# Patient Record
Sex: FEMALE | Race: OTHER | NOT HISPANIC OR LATINO
[De-identification: names, ages, dates, MRNs, and addresses within clinical notes are randomized per-mention and may not be internally consistent; named-entity substitution may affect disease eponyms.]

---

## 2017-05-24 ENCOUNTER — TRANSCRIPTION ENCOUNTER (OUTPATIENT)
Age: 62
End: 2017-05-24

## 2018-11-07 ENCOUNTER — RECORD ABSTRACTING (OUTPATIENT)
Age: 63
End: 2018-11-07

## 2018-11-07 DIAGNOSIS — Z87.09 PERSONAL HISTORY OF OTHER DISEASES OF THE RESPIRATORY SYSTEM: ICD-10-CM

## 2018-11-07 DIAGNOSIS — Z80.0 FAMILY HISTORY OF MALIGNANT NEOPLASM OF DIGESTIVE ORGANS: ICD-10-CM

## 2018-11-07 DIAGNOSIS — H83.03 LABYRINTHITIS, BILATERAL: ICD-10-CM

## 2018-11-07 DIAGNOSIS — Z83.6 FAMILY HISTORY OF OTHER DISEASES OF THE RESPIRATORY SYSTEM: ICD-10-CM

## 2018-11-07 DIAGNOSIS — Z87.440 PERSONAL HISTORY OF URINARY (TRACT) INFECTIONS: ICD-10-CM

## 2018-11-07 DIAGNOSIS — Z80.1 FAMILY HISTORY OF MALIGNANT NEOPLASM OF TRACHEA, BRONCHUS AND LUNG: ICD-10-CM

## 2018-11-07 LAB — CYTOLOGY CVX/VAG DOC THIN PREP: NORMAL

## 2018-12-11 ENCOUNTER — APPOINTMENT (OUTPATIENT)
Dept: OBGYN | Facility: CLINIC | Age: 63
End: 2018-12-11
Payer: COMMERCIAL

## 2018-12-11 VITALS
DIASTOLIC BLOOD PRESSURE: 76 MMHG | HEIGHT: 61 IN | SYSTOLIC BLOOD PRESSURE: 114 MMHG | BODY MASS INDEX: 21.14 KG/M2 | WEIGHT: 112 LBS

## 2018-12-11 PROCEDURE — 99214 OFFICE O/P EST MOD 30 MIN: CPT | Mod: 25

## 2018-12-11 PROCEDURE — 56605 BIOPSY OF VULVA/PERINEUM: CPT

## 2018-12-14 LAB — CORE LAB BIOPSY: NORMAL

## 2019-04-17 ENCOUNTER — RX RENEWAL (OUTPATIENT)
Age: 64
End: 2019-04-17

## 2019-05-21 ENCOUNTER — LABORATORY RESULT (OUTPATIENT)
Age: 64
End: 2019-05-21

## 2019-05-21 ENCOUNTER — APPOINTMENT (OUTPATIENT)
Dept: INTERNAL MEDICINE | Facility: CLINIC | Age: 64
End: 2019-05-21
Payer: COMMERCIAL

## 2019-05-21 VITALS
DIASTOLIC BLOOD PRESSURE: 70 MMHG | HEIGHT: 61 IN | BODY MASS INDEX: 21.34 KG/M2 | WEIGHT: 113 LBS | SYSTOLIC BLOOD PRESSURE: 112 MMHG

## 2019-05-21 DIAGNOSIS — R87.810 CERVICAL HIGH RISK HUMAN PAPILLOMAVIRUS (HPV) DNA TEST POSITIVE: ICD-10-CM

## 2019-05-21 DIAGNOSIS — Z86.39 PERSONAL HISTORY OF OTHER ENDOCRINE, NUTRITIONAL AND METABOLIC DISEASE: ICD-10-CM

## 2019-05-21 DIAGNOSIS — Z78.9 OTHER SPECIFIED HEALTH STATUS: ICD-10-CM

## 2019-05-21 DIAGNOSIS — R63.5 ABNORMAL WEIGHT GAIN: ICD-10-CM

## 2019-05-21 PROCEDURE — 99396 PREV VISIT EST AGE 40-64: CPT | Mod: 25

## 2019-05-21 PROCEDURE — 93000 ELECTROCARDIOGRAM COMPLETE: CPT

## 2019-05-21 PROCEDURE — 36415 COLL VENOUS BLD VENIPUNCTURE: CPT

## 2019-05-21 RX ORDER — CLOBETASOL PROPIONATE 0.5 MG/G
0.05 OINTMENT TOPICAL
Refills: 0 | Status: DISCONTINUED | COMMUNITY
End: 2019-05-21

## 2019-05-21 RX ORDER — OMEGA-3/DHA/EPA/FISH OIL 300-1000MG
1000 CAPSULE ORAL
Refills: 0 | Status: DISCONTINUED | COMMUNITY
End: 2019-05-21

## 2019-05-21 NOTE — HISTORY OF PRESENT ILLNESS
[FreeTextEntry1] : Annual exam [de-identified] : Patient is a 63-year-old female presenting for an annual exam. Her sense of well-being is good. She is eating well sleeping well. No night sweats. No chills. No fever. 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 She is very concerned. She is also upset about the death of her mother. Other domestic stresses. She's been on Zoloft in the past. It was strongly recommended that this be restarted. She agreed. She is concerned about weight gain. She works out regularly with yoga and at the gymnasium. She's had no significant trauma hospitalization, surgery, other than the HPV or serious illnesses. Systems review is largely negative.

## 2019-05-21 NOTE — HEALTH RISK ASSESSMENT
[Excellent] : ~his/her~  mood as  excellent [] : No [No falls in past year] : Patient reported no falls in the past year [0] : 2) Feeling down, depressed, or hopeless: Not at all (0)

## 2019-05-22 LAB
ALBUMIN SERPL ELPH-MCNC: 4.9 G/DL
ALP BLD-CCNC: 65 U/L
ALT SERPL-CCNC: 12 U/L
ANION GAP SERPL CALC-SCNC: 13 MMOL/L
APPEARANCE: ABNORMAL
AST SERPL-CCNC: 21 U/L
BASOPHILS # BLD AUTO: 0.04 K/UL
BASOPHILS NFR BLD AUTO: 0.8 %
BILIRUB SERPL-MCNC: 0.6 MG/DL
BILIRUBIN URINE: NEGATIVE
BLOOD URINE: NORMAL
BUN SERPL-MCNC: 12 MG/DL
CALCIUM SERPL-MCNC: 9.8 MG/DL
CHLORIDE SERPL-SCNC: 104 MMOL/L
CHOLEST SERPL-MCNC: 271 MG/DL
CHOLEST/HDLC SERPL: 2.3 RATIO
CO2 SERPL-SCNC: 23 MMOL/L
COLOR: YELLOW
CREAT SERPL-MCNC: 0.62 MG/DL
EOSINOPHIL # BLD AUTO: 0.04 K/UL
EOSINOPHIL NFR BLD AUTO: 0.8 %
GLUCOSE QUALITATIVE U: NEGATIVE
GLUCOSE SERPL-MCNC: 83 MG/DL
HCT VFR BLD CALC: 42.6 %
HDLC SERPL-MCNC: 117 MG/DL
HGB BLD-MCNC: 13.9 G/DL
IMM GRANULOCYTES NFR BLD AUTO: 0 %
KETONES URINE: ABNORMAL
LDLC SERPL CALC-MCNC: 142 MG/DL
LEUKOCYTE ESTERASE URINE: NEGATIVE
LYMPHOCYTES # BLD AUTO: 2.41 K/UL
LYMPHOCYTES NFR BLD AUTO: 46.8 %
MAN DIFF?: NORMAL
MCHC RBC-ENTMCNC: 31.8 PG
MCHC RBC-ENTMCNC: 32.6 GM/DL
MCV RBC AUTO: 97.5 FL
MONOCYTES # BLD AUTO: 0.31 K/UL
MONOCYTES NFR BLD AUTO: 6 %
NEUTROPHILS # BLD AUTO: 2.35 K/UL
NEUTROPHILS NFR BLD AUTO: 45.6 %
NITRITE URINE: POSITIVE
PH URINE: 7
PLATELET # BLD AUTO: 260 K/UL
POTASSIUM SERPL-SCNC: 5.2 MMOL/L
PROT SERPL-MCNC: 7.2 G/DL
PROTEIN URINE: NEGATIVE
RBC # BLD: 4.37 M/UL
RBC # FLD: 13.7 %
SODIUM SERPL-SCNC: 140 MMOL/L
SPECIFIC GRAVITY URINE: 1.02
T4 FREE SERPL-MCNC: 1.2 NG/DL
TRIGL SERPL-MCNC: 62 MG/DL
TSH SERPL-ACNC: 1.65 UIU/ML
UROBILINOGEN URINE: NORMAL
WBC # FLD AUTO: 5.15 K/UL

## 2019-09-25 ENCOUNTER — APPOINTMENT (OUTPATIENT)
Dept: OBGYN | Facility: CLINIC | Age: 64
End: 2019-09-25
Payer: COMMERCIAL

## 2019-09-25 ENCOUNTER — APPOINTMENT (OUTPATIENT)
Dept: INTERNAL MEDICINE | Facility: CLINIC | Age: 64
End: 2019-09-25
Payer: COMMERCIAL

## 2019-09-25 VITALS
SYSTOLIC BLOOD PRESSURE: 100 MMHG | WEIGHT: 112 LBS | DIASTOLIC BLOOD PRESSURE: 72 MMHG | HEIGHT: 61 IN | BODY MASS INDEX: 21.14 KG/M2

## 2019-09-25 VITALS
SYSTOLIC BLOOD PRESSURE: 100 MMHG | DIASTOLIC BLOOD PRESSURE: 70 MMHG | HEIGHT: 61 IN | WEIGHT: 112 LBS | BODY MASS INDEX: 21.14 KG/M2

## 2019-09-25 DIAGNOSIS — N76.0 ACUTE VAGINITIS: ICD-10-CM

## 2019-09-25 DIAGNOSIS — R92.2 INCONCLUSIVE MAMMOGRAM: ICD-10-CM

## 2019-09-25 DIAGNOSIS — Z12.31 ENCOUNTER FOR SCREENING MAMMOGRAM FOR MALIGNANT NEOPLASM OF BREAST: ICD-10-CM

## 2019-09-25 DIAGNOSIS — Z11.51 ENCOUNTER FOR SCREENING FOR HUMAN PAPILLOMAVIRUS (HPV): ICD-10-CM

## 2019-09-25 PROCEDURE — 99213 OFFICE O/P EST LOW 20 MIN: CPT

## 2019-09-25 PROCEDURE — 99396 PREV VISIT EST AGE 40-64: CPT

## 2019-09-25 NOTE — HISTORY OF PRESENT ILLNESS
[FreeTextEntry8] : Patient is a 63-year-old female presenting basically for an annual exam. However, she had one just 5 months ago. It was suggested that it is in clinic change much. She was seen by her gynecologist and pointed out that her bone density done this year revealed her to be osteoporotic in her lumbar spine. Her left hip. Interest is the fact that this hasn't changed since 2015. She is adverse to taking medications. She is actively involved in aerobics, and yoga . The effect of exercise on osteoporosis was discussed. As for diphosphonate. Other modalities are available from her endocrinologist with whom she has an appointment tomorrow. Her blood work from 5 months ago was reviewed. As was the bone density report

## 2019-09-26 ENCOUNTER — APPOINTMENT (OUTPATIENT)
Dept: ENDOCRINOLOGY | Facility: CLINIC | Age: 64
End: 2019-09-26
Payer: COMMERCIAL

## 2019-09-26 VITALS
DIASTOLIC BLOOD PRESSURE: 70 MMHG | HEART RATE: 72 BPM | BODY MASS INDEX: 23.09 KG/M2 | SYSTOLIC BLOOD PRESSURE: 100 MMHG | WEIGHT: 113 LBS | OXYGEN SATURATION: 98 % | HEIGHT: 58.75 IN

## 2019-09-26 PROBLEM — Z12.31 OTHER SCREENING MAMMOGRAM: Status: ACTIVE | Noted: 2019-09-25

## 2019-09-26 PROBLEM — Z11.51 SCREENING FOR HPV (HUMAN PAPILLOMAVIRUS): Status: ACTIVE | Noted: 2019-09-26

## 2019-09-26 PROBLEM — R92.2 DENSE BREAST: Status: ACTIVE | Noted: 2019-09-25

## 2019-09-26 PROCEDURE — 99245 OFF/OP CONSLTJ NEW/EST HI 55: CPT

## 2019-09-26 NOTE — HISTORY OF PRESENT ILLNESS
[FreeTextEntry1] : Ms. BRAYAN TOLBERT is 63 year old who presents for osteoporosis evaluation. \par she  was diagnosed of osteoporosis on the basis of     DXA scan \par her  Last [facility] scan was done in 2018 [facility], -2.5 at level os spine \par she  has had fractures -none \par  family h/o fracture -none \par She under went menopause at the age of 44 \par She received HRT    -no \par she received any treatment for osteoporosis - none \par  h/o malignancies -no \par  h/o radiation treatment -no \par  h/o clots-no \par h/o long-term steroids ( > 3 mths ) -no \par h/o phenytoin use , seizures  -no \par h/o eating disorders -no \par h/o prolonged amenorrhea -no \par Calcium intake \par Dietary - yes cheese \par supplemental -none \par Vit D intake - started yesterday \par h/o renal stones -yes \par h/o reflux disease -yes ,on PPI \par h/o malabsorption - lactose intolerant \par h/o falls or balance issues -yes \par \par

## 2019-09-27 ENCOUNTER — RESULT REVIEW (OUTPATIENT)
Age: 64
End: 2019-09-27

## 2019-09-30 ENCOUNTER — RX RENEWAL (OUTPATIENT)
Age: 64
End: 2019-09-30

## 2019-09-30 LAB
25(OH)D3 SERPL-MCNC: 20.8 NG/ML
ALBUMIN SERPL ELPH-MCNC: 4.9 G/DL
ALP BLD-CCNC: 59 U/L
ANION GAP SERPL CALC-SCNC: 13 MMOL/L
BUN SERPL-MCNC: 13 MG/DL
CALCIUM SERPL-MCNC: 9.7 MG/DL
CALCIUM SERPL-MCNC: 9.7 MG/DL
CHLORIDE SERPL-SCNC: 106 MMOL/L
CO2 SERPL-SCNC: 25 MMOL/L
COLLAGEN CTX SERPL-MCNC: 368 PG/ML
CREAT SERPL-MCNC: 0.69 MG/DL
GLUCOSE SERPL-MCNC: 96 MG/DL
M PROTEIN SPEC IFE-MCNC: NORMAL
PARATHYROID HORMONE INTACT: 53 PG/ML
PHOSPHATE SERPL-MCNC: 3 MG/DL
POTASSIUM SERPL-SCNC: 4.8 MMOL/L
SODIUM SERPL-SCNC: 144 MMOL/L

## 2019-10-03 LAB
CANDIDA VAG CYTO: NOT DETECTED
CYTOLOGY CVX/VAG DOC THIN PREP: ABNORMAL
G VAGINALIS+PREV SP MTYP VAG QL MICRO: DETECTED
HPV HIGH+LOW RISK DNA PNL CVX: NOT DETECTED
T VAGINALIS VAG QL WET PREP: NOT DETECTED

## 2019-11-18 ENCOUNTER — TRANSCRIPTION ENCOUNTER (OUTPATIENT)
Age: 64
End: 2019-11-18

## 2019-11-18 ENCOUNTER — APPOINTMENT (OUTPATIENT)
Dept: ENDOCRINOLOGY | Facility: CLINIC | Age: 64
End: 2019-11-18
Payer: MEDICARE

## 2019-11-18 VITALS
SYSTOLIC BLOOD PRESSURE: 112 MMHG | BODY MASS INDEX: 23.81 KG/M2 | HEIGHT: 58.25 IN | OXYGEN SATURATION: 99 % | DIASTOLIC BLOOD PRESSURE: 82 MMHG | HEART RATE: 74 BPM | WEIGHT: 115 LBS

## 2019-11-18 DIAGNOSIS — Z91.013 ALLERGY TO SEAFOOD: ICD-10-CM

## 2019-11-18 DIAGNOSIS — Z78.9 OTHER SPECIFIED HEALTH STATUS: ICD-10-CM

## 2019-11-18 PROCEDURE — 99215 OFFICE O/P EST HI 40 MIN: CPT

## 2019-11-18 RX ORDER — DIPHENHYDRAMINE HCL 2 %
25 CREAM (GRAM) TOPICAL
Refills: 0 | Status: ACTIVE | COMMUNITY
Start: 2019-11-18

## 2019-11-18 RX ORDER — METRONIDAZOLE 7.5 MG/G
0.75 GEL VAGINAL
Qty: 1 | Refills: 0 | Status: DISCONTINUED | COMMUNITY
Start: 2019-09-30 | End: 2019-11-18

## 2019-11-18 RX ORDER — ADHESIVE TAPE 3"X 2.3 YD
50 MCG TAPE, NON-MEDICATED TOPICAL
Refills: 0 | Status: ACTIVE | COMMUNITY

## 2019-11-18 RX ORDER — FOLIC ACID 0.8 MG
500 TABLET ORAL
Refills: 0 | Status: ACTIVE | COMMUNITY

## 2019-11-18 NOTE — REVIEW OF SYSTEMS
[Fatigue] : fatigue [Palpitations] : palpitations [Muscle Weakness] : muscle weakness [Muscle Cramps] : muscle cramps [Back Pain] : back pain [Depression] : depression [Negative] : Psychiatric [Myalgia] : no myalgia  [FreeTextEntry5] : increased MN per pt seen Cardiology few years ago  [FreeTextEntry4] : ringing in ears, loss of hearing sometimes, hoarseness seen ENT [de-identified] : memory problems , difficulty with concentrations [de-identified] : especially numbness/tingling in hands  [FreeTextEntry9] : loss of height 1inches and a quarter

## 2019-11-18 NOTE — HISTORY OF PRESENT ILLNESS
[Vitamin D (supplements)] : Vitamin D as a dietary supplement [Premat. Menopause (natural)] : premature menopause which occurred naturally [Amenorrhea] : a history of amenorrhea [Kidney Stones] : a history of kidney stones [Family History of Osteoporosis] : family history of osteoporosis [Regular Dental Follow-Up] : regular dental follow-up [Loss of Height] : loss of height [FreeTextEntry1] : Ms. BRAYAN TOLBERT is a 64 year old female\par self referred for osteoporosis seen Dr Vieira 9/19 but wanted to see me \par had one episode of kidney stones 2012 \par denies h/o fractures\par had some amenorrhea episodes per pt for years\par menopause 44\par on Omeproazole on and off for GERD\par \par Per Dr Vieira notes : \par "Ms. BRAYAN TOLBERT is 63 year old who presents for osteoporosis evaluation. \par she was diagnosed of osteoporosis on the basis of DXA scan \par her Last DXA scan was done in 2018 at [facility], -2.5 at level os spine \par she has had fractures -none \par  family h/o fracture -none \par She under went menopause at the age of 44 \par She received HRT -no \par she received any treatment for osteoporosis - none \par  h/o malignancies -no \par  h/o radiation treatment -no \par  h/o clots-no \par h/o long-term steroids ( > 3 mths ) -no \par h/o phenytoin use , seizures -no \par h/o eating disorders -no \par h/o prolonged amenorrhea -no \par Calcium intake \par Dietary - yes cheese \par supplemental -none \par Vit D intake - started yesterday \par h/o renal stones -yes \par h/o reflux disease -yes ,on PPI \par h/o malabsorption - lactose intolerant \par h/o falls or balance issues -yes "\par  [Disordered Eating] : no past or present history of disordered eating [Premat. Menopause (surg)] : no past or present history of premature surgical menopause [Sedentary] : no past or present history of a sedentary lifestyle [Taking Steroids] : no past or present history of taking steroids [Current Smoking___(ppd)] : not currently smoking [High Fall Risk] : no fall risk [Family History of Hip Fracture] : no family history of hip fracture [Frequent Falls] : no frequent falls [Uses a Walker/Cane] : no use of a walker/cane [History of Radiation Therapy] : no history of radiation therapy [Hyperparathyroidism] : no hyperparathyroidism [Previous Fragility Fracture] : no previous fragility fracture [History of Blood Clots] : no history of blood clots

## 2019-11-18 NOTE — PHYSICAL EXAM
[Alert] : alert [Well Nourished] : well nourished [No Acute Distress] : no acute distress [Well Developed] : well developed [EOMI] : extra ocular movement intact [Normal Sclera/Conjunctiva] : normal sclera/conjunctiva [No Proptosis] : no proptosis [Normal Oropharynx] : the oropharynx was normal [Thyroid Not Enlarged] : the thyroid was not enlarged [No Thyroid Nodules] : there were no palpable thyroid nodules [No Accessory Muscle Use] : no accessory muscle use [No Respiratory Distress] : no respiratory distress [Clear to Auscultation] : lungs were clear to auscultation bilaterally [Normal S1, S2] : normal S1 and S2 [Normal Rate] : heart rate was normal  [Pedal Pulses Normal] : the pedal pulses are present [Regular Rhythm] : with a regular rhythm [No Edema] : there was no peripheral edema [Normal Bowel Sounds] : normal bowel sounds [Not Tender] : non-tender [Soft] : abdomen soft [Not Distended] : not distended [Post Cervical Nodes] : posterior cervical nodes [Anterior Cervical Nodes] : anterior cervical nodes [Normal] : normal and non tender [Axillary Nodes] : axillary nodes [No Spinal Tenderness] : no spinal tenderness [Spine Straight] : spine straight [Normal Gait] : normal gait [No Stigmata of Cushings Syndrome] : no stigmata of cushings syndrome [Normal Strength/Tone] : muscle strength and tone were normal [No Rash] : no rash [No Tremors] : no tremors [Normal Reflexes] : deep tendon reflexes were 2+ and symmetric [Oriented x3] : oriented to person, place, and time [Acanthosis Nigricans] : no acanthosis nigricans

## 2019-11-18 NOTE — ASSESSMENT
[Bisphosphonate Therapy] : Risks  and benefits of bisphosphonate therapy were  discussed with the patient including gastroesophageal irritation, osteonecrosis of the jaw, and atypical femur fractures, and acute phase reaction [Bisphosphonates] : The patient was instructed to take bisphosphonates on an empty stomach with a full glass of water,and wait at least 30 minutes before eating or lying down [FreeTextEntry1] : Actonel side effects as ONJ, bone aches. GERD etc call us for any side effects

## 2019-11-26 ENCOUNTER — RESULT REVIEW (OUTPATIENT)
Age: 64
End: 2019-11-26

## 2019-12-03 ENCOUNTER — LABORATORY RESULT (OUTPATIENT)
Age: 64
End: 2019-12-03

## 2019-12-03 LAB
24R-OH-CALCIDIOL SERPL-MCNC: 102 PG/ML
25(OH)D3 SERPL-MCNC: 25.7 NG/ML
CALCIUM SERPL-MCNC: 10 MG/DL
MAGNESIUM SERPL-MCNC: 2.4 MG/DL
PARATHYROID HORMONE INTACT: 52 PG/ML
PHOSPHATE SERPL-MCNC: 3 MG/DL

## 2020-01-03 LAB
COLLAGEN NTX UR-SCNC: 54
CREAT UR-MCNC: 35 MG/DL

## 2020-01-06 ENCOUNTER — MEDICATION RENEWAL (OUTPATIENT)
Age: 65
End: 2020-01-06

## 2020-02-06 ENCOUNTER — APPOINTMENT (OUTPATIENT)
Dept: UROLOGY | Facility: CLINIC | Age: 65
End: 2020-02-06
Payer: MEDICARE

## 2020-02-06 VITALS
WEIGHT: 111 LBS | SYSTOLIC BLOOD PRESSURE: 117 MMHG | BODY MASS INDEX: 21.79 KG/M2 | HEIGHT: 60 IN | DIASTOLIC BLOOD PRESSURE: 80 MMHG | RESPIRATION RATE: 89 BRPM

## 2020-02-06 DIAGNOSIS — Z82.0 FAMILY HISTORY OF EPILEPSY AND OTHER DISEASES OF THE NERVOUS SYSTEM: ICD-10-CM

## 2020-02-06 DIAGNOSIS — Z82.62 FAMILY HISTORY OF OSTEOPOROSIS: ICD-10-CM

## 2020-02-06 DIAGNOSIS — Z80.0 FAMILY HISTORY OF MALIGNANT NEOPLASM OF DIGESTIVE ORGANS: ICD-10-CM

## 2020-02-06 DIAGNOSIS — Z82.49 FAMILY HISTORY OF ISCHEMIC HEART DISEASE AND OTHER DISEASES OF THE CIRCULATORY SYSTEM: ICD-10-CM

## 2020-02-06 PROCEDURE — 99204 OFFICE O/P NEW MOD 45 MIN: CPT

## 2020-02-06 PROCEDURE — 76775 US EXAM ABDO BACK WALL LIM: CPT

## 2020-02-06 RX ORDER — CLOBETASOL PROPIONATE 0.5 MG/G
0.05 CREAM TOPICAL
Refills: 0 | Status: ACTIVE | COMMUNITY

## 2020-02-06 NOTE — ADDENDUM
[FreeTextEntry1] : Bilateral renal ultrasound\par \par \par Evaluation of both kidneys was carried out with the findings of a 100% normal right kidney the left kidney is normal in size and is no obvious lesion no large stones seen the patient has been informed that if there's a small stone could not be seen with this technology

## 2020-02-06 NOTE — ASSESSMENT
[FreeTextEntry1] : This is an initial visit to [facility] urology for this 64-year-old patient who has had progression of her osteopenia to the point where she now has osteoporosis\par There is an additional history of admission 2 [facility] for fever and flank pain he was told that she "might have a kidney stone"\par His never been a definitive diagnosis of kidney stone but the patient did have flank pain and was treated as a possible sepsis but cultures were negative\par She has no subsequent episodes of any kind\par Gross hematuria and but has been told that she has excessive excretion of calcium\par I therefore a serum calcium parathormone level and into the renal ultrasound shows no obvious obstruction or stone\par I told the patient if she has another episode of kidney stone like pain a CT of the abdomen and pelvis without contrast would be the next appropriate step

## 2020-02-07 LAB
ALBUMIN SERPL ELPH-MCNC: 4.6 G/DL
ALP BLD-CCNC: 57 U/L
ALT SERPL-CCNC: 17 U/L
ANION GAP SERPL CALC-SCNC: 14 MMOL/L
AST SERPL-CCNC: 21 U/L
BILIRUB SERPL-MCNC: 0.4 MG/DL
BUN SERPL-MCNC: 14 MG/DL
CALCIUM SERPL-MCNC: 9.3 MG/DL
CHLORIDE SERPL-SCNC: 105 MMOL/L
CO2 SERPL-SCNC: 22 MMOL/L
CREAT SERPL-MCNC: 0.63 MG/DL
GLUCOSE SERPL-MCNC: 144 MG/DL
POTASSIUM SERPL-SCNC: 4.5 MMOL/L
PROT SERPL-MCNC: 6.4 G/DL
SODIUM SERPL-SCNC: 141 MMOL/L

## 2020-02-10 LAB
CALCIUM SERPL-MCNC: 9.5 MG/DL
PARATHYROID HORMONE INTACT: 52 PG/ML

## 2020-03-06 ENCOUNTER — APPOINTMENT (OUTPATIENT)
Dept: ENDOCRINOLOGY | Facility: CLINIC | Age: 65
End: 2020-03-06
Payer: MEDICARE

## 2020-03-06 VITALS
SYSTOLIC BLOOD PRESSURE: 108 MMHG | DIASTOLIC BLOOD PRESSURE: 68 MMHG | BODY MASS INDEX: 21.6 KG/M2 | OXYGEN SATURATION: 96 % | HEART RATE: 82 BPM | WEIGHT: 110 LBS | HEIGHT: 60 IN

## 2020-03-06 PROCEDURE — 36415 COLL VENOUS BLD VENIPUNCTURE: CPT

## 2020-03-06 PROCEDURE — 99214 OFFICE O/P EST MOD 30 MIN: CPT | Mod: 25

## 2020-03-06 RX ORDER — RISEDRONATE SODIUM 150 MG/1
150 TABLET, FILM COATED ORAL
Qty: 1 | Refills: 1 | Status: DISCONTINUED | COMMUNITY
Start: 2020-01-03 | End: 2020-03-06

## 2020-03-09 LAB
24R-OH-CALCIDIOL SERPL-MCNC: 97.9 PG/ML
25(OH)D3 SERPL-MCNC: 54.7 NG/ML
ALBUMIN SERPL ELPH-MCNC: 4.7 G/DL
ALP BLD-CCNC: 52 U/L
ANION GAP SERPL CALC-SCNC: 15 MMOL/L
BUN SERPL-MCNC: 18 MG/DL
CA-I SERPL-SCNC: 1.21 MMOL/L
CALCIUM SERPL-MCNC: 9.7 MG/DL
CHLORIDE SERPL-SCNC: 104 MMOL/L
CO2 SERPL-SCNC: 22 MMOL/L
CREAT SERPL-MCNC: 0.65 MG/DL
GLUCOSE SERPL-MCNC: 81 MG/DL
MAGNESIUM SERPL-MCNC: 2.1 MG/DL
PHOSPHATE SERPL-MCNC: 3.2 MG/DL
POTASSIUM SERPL-SCNC: 4.3 MMOL/L
SODIUM SERPL-SCNC: 141 MMOL/L

## 2020-03-10 ENCOUNTER — APPOINTMENT (OUTPATIENT)
Dept: INTERNAL MEDICINE | Facility: CLINIC | Age: 65
End: 2020-03-10
Payer: MEDICARE

## 2020-03-10 ENCOUNTER — MED ADMIN CHARGE (OUTPATIENT)
Age: 65
End: 2020-03-10

## 2020-03-10 DIAGNOSIS — Z23 ENCOUNTER FOR IMMUNIZATION: ICD-10-CM

## 2020-03-10 PROCEDURE — G0008: CPT

## 2020-03-10 PROCEDURE — 90686 IIV4 VACC NO PRSV 0.5 ML IM: CPT

## 2020-03-10 PROCEDURE — G0009: CPT

## 2020-03-10 PROCEDURE — 90732 PPSV23 VACC 2 YRS+ SUBQ/IM: CPT

## 2020-03-10 NOTE — HISTORY OF PRESENT ILLNESS
[FreeTextEntry1] : Ms. BRAYAN TOLBERT is a 64 year old female\par self referred for osteoporosis seen Dr Vieira 9/19 but wanted to see me \par had lip swelling pretty severe same day with her first Actonel dose , but also she is allergic to shellfish, ate outside with a fried few hrs before it happened \par had one episode of kidney stones 2012 \par denies h/o fractures\par had some amenorrhea episodes per pt for years\par menopause 44\par on Omeproazole on and off for GERD\par \par Per Dr Vieira notes : \par "Ms. BRAYAN TOLBERT is 63 year old who presents for osteoporosis evaluation. \par she was diagnosed of osteoporosis on the basis of DXA scan \par her Last DXA scan was done in 2018 at [facility], -2.5 at level os spine \par she has had fractures -none \par  family h/o fracture -none \par She under went menopause at the age of 44 \par She received HRT -no \par she received any treatment for osteoporosis - none \par  h/o malignancies -no \par  h/o radiation treatment -no \par  h/o clots-no \par h/o long-term steroids ( > 3 mths ) -no \par h/o phenytoin use , seizures -no \par h/o eating disorders -no \par h/o prolonged amenorrhea -no \par Calcium intake \par Dietary - yes cheese \par supplemental -none \par Vit D intake - started yesterday \par h/o renal stones -yes \par h/o reflux disease -yes ,on PPI \par h/o malabsorption - lactose intolerant \par h/o falls or balance issues -yes "\par

## 2020-06-17 ENCOUNTER — APPOINTMENT (OUTPATIENT)
Dept: INTERNAL MEDICINE | Facility: CLINIC | Age: 65
End: 2020-06-17
Payer: MEDICARE

## 2020-06-17 VITALS
DIASTOLIC BLOOD PRESSURE: 80 MMHG | WEIGHT: 111 LBS | HEIGHT: 60 IN | SYSTOLIC BLOOD PRESSURE: 120 MMHG | BODY MASS INDEX: 21.79 KG/M2

## 2020-06-17 DIAGNOSIS — K22.4 DYSKINESIA OF ESOPHAGUS: ICD-10-CM

## 2020-06-17 PROCEDURE — 99213 OFFICE O/P EST LOW 20 MIN: CPT

## 2020-06-17 NOTE — HISTORY OF PRESENT ILLNESS
[FreeTextEntry8] : Patient is a 64-year-old female presented complaining of abdominal pain. The pain is largely epigastric occasionally right upper quadrant and occasionally radiating to the back. She complains also of 13 occasion inability to swallow with a crushing sort of feeling in her chest and feeling like something was just basically stuck. He wouldn't go through. She has no, eructation she's not brought up previously swallowed food. She manages to get this through and it was problematic for her. She also has had a significant change in her bowel habits, where she used to be constipated at this point in time. She has nothing but liquids up to 10 times a day and twice at night according to her. Her gallbladder was discussed the importance of an upper endoscopy in relationship to possible stricture or spasm of her distal esophagus and a significant change in her bowel habits, strongly suggestive of stricture or obstruction. She's being referred to gastroenterology for further evaluation.

## 2020-06-17 NOTE — PHYSICAL EXAM
[Well Nourished] : well nourished [No Acute Distress] : no acute distress [Well Developed] : well developed [Well-Appearing] : well-appearing [Normal Sclera/Conjunctiva] : normal sclera/conjunctiva [PERRL] : pupils equal round and reactive to light [Normal Oropharynx] : the oropharynx was normal [Normal Outer Ear/Nose] : the outer ears and nose were normal in appearance [EOMI] : extraocular movements intact [No JVD] : no jugular venous distention [Thyroid Normal, No Nodules] : the thyroid was normal and there were no nodules present [Supple] : supple [No Lymphadenopathy] : no lymphadenopathy [No Respiratory Distress] : no respiratory distress  [No Accessory Muscle Use] : no accessory muscle use [Clear to Auscultation] : lungs were clear to auscultation bilaterally [Regular Rhythm] : with a regular rhythm [Normal Rate] : normal rate  [Normal S1, S2] : normal S1 and S2 [No Murmur] : no murmur heard [No Carotid Bruits] : no carotid bruits [No Varicosities] : no varicosities [No Abdominal Bruit] : a ~M bruit was not heard ~T in the abdomen [Pedal Pulses Present] : the pedal pulses are present [No Edema] : there was no peripheral edema [No Palpable Aorta] : no palpable aorta [No Extremity Clubbing/Cyanosis] : no extremity clubbing/cyanosis [Non-distended] : non-distended [Non Tender] : non-tender [Soft] : abdomen soft [No HSM] : no HSM [No Masses] : no abdominal mass palpated [Normal Bowel Sounds] : normal bowel sounds [Normal Anterior Cervical Nodes] : no anterior cervical lymphadenopathy [No Hernias] : no hernias [Normal Posterior Cervical Nodes] : no posterior cervical lymphadenopathy [No Joint Swelling] : no joint swelling [No CVA Tenderness] : no CVA  tenderness [No Spinal Tenderness] : no spinal tenderness [Grossly Normal Strength/Tone] : grossly normal strength/tone [Coordination Grossly Intact] : coordination grossly intact [No Rash] : no rash [No Focal Deficits] : no focal deficits [Deep Tendon Reflexes (DTR)] : deep tendon reflexes were 2+ and symmetric [Normal Gait] : normal gait [Normal Insight/Judgement] : insight and judgment were intact [Normal Affect] : the affect was normal [de-identified] : ? palp GallBladder

## 2020-06-25 ENCOUNTER — APPOINTMENT (OUTPATIENT)
Dept: GASTROENTEROLOGY | Facility: CLINIC | Age: 65
End: 2020-06-25
Payer: MEDICARE

## 2020-06-25 VITALS
HEART RATE: 70 BPM | WEIGHT: 111 LBS | SYSTOLIC BLOOD PRESSURE: 120 MMHG | DIASTOLIC BLOOD PRESSURE: 86 MMHG | BODY MASS INDEX: 21.79 KG/M2 | OXYGEN SATURATION: 98 % | TEMPERATURE: 98.2 F | HEIGHT: 60 IN

## 2020-06-25 DIAGNOSIS — R19.4 CHANGE IN BOWEL HABIT: ICD-10-CM

## 2020-06-25 PROCEDURE — 99204 OFFICE O/P NEW MOD 45 MIN: CPT

## 2020-06-25 NOTE — HISTORY OF PRESENT ILLNESS
[FreeTextEntry1] : 64f osteoporosis, hx colon polyps here for evaluation of swallowing difficulty.  She states that for 6mo has had rare episodes (3) sensing food not passing upper chest after swallow.  No choking.  No fevers/wt loss.  She does state that it happens with liquids and improves with slowing down.  No autoimmune hx.  No n/v.  No chest pain/sob.  She has been on omeprazole prn for 2 years for heartburn.\par \par She also notes liquid stools over last year with approximately 4watery stools daily with urgency and rare occasions of incontinence and nocturnal BMs.  Lactose intolerant for years, but maintains lactose-free diet.  No bleeding/abd pain.  \par \par 11/2016 colon polyp - 5y f/u\par \par Soc:  no tobacco or significant EtOH\par FHx: no FHx GI malignancy or IBD\par \par ROS:\par Constitutional:: no weight loss, fevers\par ENT: no deafness\par Eyes: not blind\par Neck: no LN\par Chest: no dyspnea/cough\par Cardiac: no chest pain\par Vascular: no leg swelling\par GI: no abdominal pain, nausea, vomiting, diarrhea, constipation, rectal bleeding, dysphagia, melena unless otherwise noted in HPI\par : no dysuria, dark urine\par Skin: no rashes, jaundice\par Heme: no bleeding\par Endocrine: no DM unless otherwise stated in HPI\par \par Px: (VS noted below)\par General: NAD\par Eyes: anicteric\par Oropharynx:  clear\par Neck: no LN\par Chest: normal respiratory effort\par CVS: regular\par Abd: soft, NT, ND, +BS, no HSM\par Ext: no atrophy\par Neuro: grossly nonfocal\par \par Labs/imaging/prior endoscopic results reviewed to the extent available and noted in HPI

## 2020-06-25 NOTE — ASSESSMENT
[FreeTextEntry1] : -change in BMs - diarrhea - doubt infectious w/ chronicity, but will send stool testing and then proceed w/ colonoscopy to r/o colitis\par \par -dysphagia - unclear etiology - r/o stricture, though liquids seem to be the problem - ?motility d/o\par \par -hx polyps - due next year, but will need to do sooner colonoscopy for diarrheal symptoms

## 2020-06-25 NOTE — REASON FOR VISIT
[Consultation] : a consultation visit [FreeTextEntry1] : Kindly asked by UBALDO RAMIREZ MD  to consult and evaluate patient for swallowing difficulty                    \par A copy of this note is being sent to physician requesting consultation.

## 2020-06-25 NOTE — CONSULT LETTER
[FreeTextEntry1] : Dear Dr. UBALDO RAMIREZ ,\par \par I had the pleasure of evaluating your patient,  BRAYAN TOLBERT.\par \par Please refer to my note below.\par \par Thank you very much for allowing me to participate in the care of this patient.  If you have any questions, please do not hesitate to contact me.\par \par Sincerely, \par \par Fareed Gonzalez MD\par

## 2020-06-30 ENCOUNTER — RESULT REVIEW (OUTPATIENT)
Age: 65
End: 2020-06-30

## 2020-06-30 LAB
BACTERIA STL CULT: NORMAL
C DIFF TOX GENS STL QL NAA+PROBE: NORMAL
CDIFF BY PCR: NOT DETECTED
DEPRECATED O AND P PREP STL: NORMAL

## 2020-07-01 ENCOUNTER — RESULT REVIEW (OUTPATIENT)
Age: 65
End: 2020-07-01

## 2020-07-02 ENCOUNTER — APPOINTMENT (OUTPATIENT)
Dept: GASTROENTEROLOGY | Facility: HOSPITAL | Age: 65
End: 2020-07-02

## 2020-07-06 ENCOUNTER — RESULT REVIEW (OUTPATIENT)
Age: 65
End: 2020-07-06

## 2020-09-28 ENCOUNTER — RESULT REVIEW (OUTPATIENT)
Age: 65
End: 2020-09-28

## 2020-09-30 ENCOUNTER — RESULT REVIEW (OUTPATIENT)
Age: 65
End: 2020-09-30

## 2020-10-01 ENCOUNTER — APPOINTMENT (OUTPATIENT)
Dept: GASTROENTEROLOGY | Facility: HOSPITAL | Age: 65
End: 2020-10-01

## 2020-10-20 ENCOUNTER — APPOINTMENT (OUTPATIENT)
Dept: GASTROENTEROLOGY | Facility: CLINIC | Age: 65
End: 2020-10-20
Payer: MEDICARE

## 2020-10-20 VITALS
HEIGHT: 60 IN | WEIGHT: 111 LBS | TEMPERATURE: 98 F | HEART RATE: 81 BPM | SYSTOLIC BLOOD PRESSURE: 110 MMHG | BODY MASS INDEX: 21.79 KG/M2 | DIASTOLIC BLOOD PRESSURE: 80 MMHG | OXYGEN SATURATION: 99 %

## 2020-10-20 DIAGNOSIS — K63.5 POLYP OF COLON: ICD-10-CM

## 2020-10-20 PROCEDURE — 99214 OFFICE O/P EST MOD 30 MIN: CPT | Mod: 25

## 2020-10-20 PROCEDURE — 99072 ADDL SUPL MATRL&STAF TM PHE: CPT

## 2020-10-20 NOTE — HISTORY OF PRESENT ILLNESS
[FreeTextEntry1] : Ms. Mccurdy is a 64F h/o osteoporosis, colon polyps who is seen for a cecal polyp.\par \par Initial colonoscopy with Dr. Gonzalez 7/2/20:\par 1 x 2.5 cm sessile cecal polyp removed (midway between ICV and appendiceal orifice) s/p EMR, two clips placed\par \par Pathology for cecal polyp revealed a tubular adenoma.\par \par Repeat colonoscopy 10/1/20:\par Thickened, slightly nodular fold between the ICV and appendiceal orifice removed via piecemeal cold snare and biopsy.\par \par Pathology for cecal polyp revealed a hyperplastic polyp.\par \par Feels well.  Referred for endoscopic evaluation of her cecal polyp site with additional endoscopic resection.

## 2020-10-20 NOTE — ASSESSMENT
[FreeTextEntry1] : Will plan on a colonoscopy for cecal polypectomy.  Explained risks/benefits/alternatives including not limited to bleeding, infection, perforation, missed lesions, anesthesia complications.  Patient understands and agrees, all questions answered.  Will use a split dose miralax/gatorade prep with clears the day prior.\par \par Discussed discordance of pathology specimens from the same polyp site.  Discussed risk of her polyp being a sessile serrated adenoma.\par \par Will plan on a 90 minute procedure.  Will schedule within a range of 3-6 months since her last colonoscopy (10/1/20).

## 2020-10-20 NOTE — PHYSICAL EXAM
[General Appearance - Alert] : alert [General Appearance - In No Acute Distress] : in no acute distress [Sclera] : the sclera and conjunctiva were normal [Outer Ear] : the ears and nose were normal in appearance [] : no respiratory distress [Neck Appearance] : the appearance of the neck was normal [Abnormal Walk] : normal gait [Skin Color & Pigmentation] : normal skin color and pigmentation [Cranial Nerves] : cranial nerves 2-12 were intact [Affect] : the affect was normal

## 2020-10-26 ENCOUNTER — APPOINTMENT (OUTPATIENT)
Dept: INTERNAL MEDICINE | Facility: CLINIC | Age: 65
End: 2020-10-26
Payer: MEDICARE

## 2020-10-26 VITALS
SYSTOLIC BLOOD PRESSURE: 142 MMHG | DIASTOLIC BLOOD PRESSURE: 80 MMHG | BODY MASS INDEX: 21.79 KG/M2 | WEIGHT: 111 LBS | HEIGHT: 60 IN

## 2020-10-26 DIAGNOSIS — Z23 ENCOUNTER FOR IMMUNIZATION: ICD-10-CM

## 2020-10-26 DIAGNOSIS — M67.431 GANGLION, RIGHT WRIST: ICD-10-CM

## 2020-10-26 PROCEDURE — 99213 OFFICE O/P EST LOW 20 MIN: CPT | Mod: 25

## 2020-10-26 PROCEDURE — G0008: CPT

## 2020-10-26 PROCEDURE — 99072 ADDL SUPL MATRL&STAF TM PHE: CPT

## 2020-10-26 PROCEDURE — 90662 IIV NO PRSV INCREASED AG IM: CPT

## 2020-10-26 NOTE — PHYSICAL EXAM
[No Acute Distress] : no acute distress [Well Nourished] : well nourished [Well Developed] : well developed [Well-Appearing] : well-appearing [Normal Sclera/Conjunctiva] : normal sclera/conjunctiva [PERRL] : pupils equal round and reactive to light [EOMI] : extraocular movements intact [Normal Outer Ear/Nose] : the outer ears and nose were normal in appearance [Normal Oropharynx] : the oropharynx was normal [No JVD] : no jugular venous distention [No Lymphadenopathy] : no lymphadenopathy [Supple] : supple [Thyroid Normal, No Nodules] : the thyroid was normal and there were no nodules present [No Respiratory Distress] : no respiratory distress  [No Accessory Muscle Use] : no accessory muscle use [Clear to Auscultation] : lungs were clear to auscultation bilaterally [Normal Rate] : normal rate  [Regular Rhythm] : with a regular rhythm [Normal S1, S2] : normal S1 and S2 [No Murmur] : no murmur heard [No Carotid Bruits] : no carotid bruits [No Abdominal Bruit] : a ~M bruit was not heard ~T in the abdomen [No Varicosities] : no varicosities [Pedal Pulses Present] : the pedal pulses are present [No Edema] : there was no peripheral edema [No Palpable Aorta] : no palpable aorta [No Extremity Clubbing/Cyanosis] : no extremity clubbing/cyanosis [Soft] : abdomen soft [Non Tender] : non-tender [Non-distended] : non-distended [No Masses] : no abdominal mass palpated [No HSM] : no HSM [Normal Bowel Sounds] : normal bowel sounds [Normal Posterior Cervical Nodes] : no posterior cervical lymphadenopathy [Normal Anterior Cervical Nodes] : no anterior cervical lymphadenopathy [No CVA Tenderness] : no CVA  tenderness [No Spinal Tenderness] : no spinal tenderness [No Joint Swelling] : no joint swelling [Grossly Normal Strength/Tone] : grossly normal strength/tone [No Rash] : no rash [Coordination Grossly Intact] : coordination grossly intact [No Focal Deficits] : no focal deficits [Normal Gait] : normal gait [Normal Affect] : the affect was normal [Normal Insight/Judgement] : insight and judgment were intact [de-identified] : small ganglion volar surface, right wrist

## 2020-10-26 NOTE — HISTORY OF PRESENT ILLNESS
[FreeTextEntry8] : Patient is a 65-year-old female presenting with minor swelling. Some tenderness in the medial aspect of her volar wrist feels like a very small ganglion. She was concerned about the visible pulsatile radial artery. Which essentially is normal. The pathophysiology of the ganglion was discussed. It was recommended she use ice minor pressure and time to see if it resolves. If not, hand surgeon, was suggested. She was reassured it represented no risk to her.

## 2020-12-09 ENCOUNTER — APPOINTMENT (OUTPATIENT)
Dept: OBGYN | Facility: CLINIC | Age: 65
End: 2020-12-09
Payer: MEDICARE

## 2020-12-09 ENCOUNTER — LABORATORY RESULT (OUTPATIENT)
Age: 65
End: 2020-12-09

## 2020-12-09 VITALS
HEIGHT: 60 IN | DIASTOLIC BLOOD PRESSURE: 84 MMHG | SYSTOLIC BLOOD PRESSURE: 130 MMHG | BODY MASS INDEX: 22.58 KG/M2 | WEIGHT: 115 LBS | TEMPERATURE: 98.1 F

## 2020-12-09 PROCEDURE — 99072 ADDL SUPL MATRL&STAF TM PHE: CPT

## 2020-12-09 PROCEDURE — G0101: CPT

## 2020-12-09 NOTE — HISTORY OF PRESENT ILLNESS
[FreeTextEntry1] : 65 y o female presents for routine annual GYN care. Her last annual GYN visit was in 12/2018 and last pap smear was NILM/HPV negative.\par She states she is well and denies current GYN complaints other than vaginal irritation related to her diagnosis of lichen sclerosus and decreased libido. She uses Clobetasol twice weekly which is prescribed by a specialist she saw for the lichen sclerosis. She reports normal bowel and bladder function.\par Breast imaging in 9/2019 was benign BI-RADS 1\par Bone density in 11/2019 showed osteopenia hip and osteoporosis spine: she follows with endocrinology.\par Colonoscopy 2020 showed cecal polyp which is benign however needs additional procedures due to location and discrepancy: she is being seen by GI\par \par

## 2020-12-10 ENCOUNTER — RESULT REVIEW (OUTPATIENT)
Age: 65
End: 2020-12-10

## 2020-12-16 PROBLEM — Z87.09 HISTORY OF UPPER RESPIRATORY INFECTION: Status: RESOLVED | Noted: 2018-11-07 | Resolved: 2020-12-16

## 2020-12-18 ENCOUNTER — APPOINTMENT (OUTPATIENT)
Dept: ENDOCRINOLOGY | Facility: CLINIC | Age: 65
End: 2020-12-18
Payer: MEDICARE

## 2020-12-18 VITALS
DIASTOLIC BLOOD PRESSURE: 68 MMHG | HEIGHT: 60 IN | SYSTOLIC BLOOD PRESSURE: 108 MMHG | BODY MASS INDEX: 21.99 KG/M2 | WEIGHT: 112 LBS

## 2020-12-18 PROCEDURE — 99072 ADDL SUPL MATRL&STAF TM PHE: CPT

## 2020-12-18 PROCEDURE — 99214 OFFICE O/P EST MOD 30 MIN: CPT

## 2020-12-18 NOTE — REVIEW OF SYSTEMS
[Joint Pain] : joint pain [Back Pain] : back pain [FreeTextEntry9] : chronic per pt getting worse after a recent fall , no fractures

## 2020-12-18 NOTE — HISTORY OF PRESENT ILLNESS
[FreeTextEntry1] : Ms. BRAYAN TOLBERT is a 65 year old female\par initialy self referred for osteoporosis seen Dr Vieira 9/19 but wanted to see me , received once Prolia 7/2020 tolerated well no side effects\par reports joint aches chronically not getting worse, does have a  , walking 4hrs at a time \par labs reviewed very good \par had lip swelling pretty severe same day with her first Actonel dose , but also she is allergic to shellfish, ate outside with a fried few hrs before it happened \par had one episode of kidney stones 2012 \par denies h/o fractures\par had some amenorrhea episodes per pt for years\par menopause 44\par on Omeproazole on and off for GERD\par \par Per Dr Vieira notes : \par "Ms. BRAYAN TOLBERT is 63 year old who presents for osteoporosis evaluation. \par she was diagnosed of osteoporosis on the basis of DXA scan \par her Last D[facility] scan was done in 2018 at [facility], -2.5 at level os spine \par she has had fractures -none \par  family h/o fracture -none \par She under went menopause at the age of 44 \par She received HRT -no \par she received any treatment for osteoporosis - none \par  h/o malignancies -no \par  h/o radiation treatment -no \par  h/o clots-no \par h/o long-term steroids ( > 3 mths ) -no \par h/o phenytoin use , seizures -no \par h/o eating disorders -no \par h/o prolonged amenorrhea -no \par Calcium intake \par Dietary - yes cheese \par supplemental -none \par Vit D intake - started yesterday \par h/o renal stones -yes \par h/o reflux disease -yes ,on PPI \par h/o malabsorption - lactose intolerant \par h/o falls or balance issues -yes "\par

## 2020-12-21 PROBLEM — N76.0 VAGINITIS AND VULVOVAGINITIS: Status: RESOLVED | Noted: 2019-09-25 | Resolved: 2020-12-21

## 2021-01-04 ENCOUNTER — APPOINTMENT (OUTPATIENT)
Dept: INTERNAL MEDICINE | Facility: CLINIC | Age: 66
End: 2021-01-04

## 2021-01-12 ENCOUNTER — RESULT REVIEW (OUTPATIENT)
Age: 66
End: 2021-01-12

## 2021-01-15 ENCOUNTER — RESULT REVIEW (OUTPATIENT)
Age: 66
End: 2021-01-15

## 2021-01-19 ENCOUNTER — APPOINTMENT (OUTPATIENT)
Dept: FAMILY MEDICINE | Facility: CLINIC | Age: 66
End: 2021-01-19
Payer: MEDICARE

## 2021-01-19 VITALS
DIASTOLIC BLOOD PRESSURE: 78 MMHG | WEIGHT: 122 LBS | TEMPERATURE: 97.8 F | HEART RATE: 92 BPM | RESPIRATION RATE: 16 BRPM | HEIGHT: 60 IN | BODY MASS INDEX: 23.95 KG/M2 | SYSTOLIC BLOOD PRESSURE: 122 MMHG | OXYGEN SATURATION: 100 %

## 2021-01-19 DIAGNOSIS — R14.0 ABDOMINAL DISTENSION (GASEOUS): ICD-10-CM

## 2021-01-19 DIAGNOSIS — M54.6 PAIN IN THORACIC SPINE: ICD-10-CM

## 2021-01-19 PROCEDURE — 99072 ADDL SUPL MATRL&STAF TM PHE: CPT

## 2021-01-19 PROCEDURE — 99214 OFFICE O/P EST MOD 30 MIN: CPT

## 2021-01-19 RX ORDER — DENOSUMAB 60 MG/ML
60 INJECTION SUBCUTANEOUS
Refills: 0 | Status: ACTIVE | COMMUNITY

## 2021-01-19 NOTE — HISTORY OF PRESENT ILLNESS
[FreeTextEntry8] : 66 yo F with hx of osteoporosis and colonic polyp, presenting for evaluation for lower thoracic to lumbar back discomfort x2 months and feelings of abdominal bloating. Patient reports she was previously very active and exercised and still does however back pain has not dissipated and at times can be as high as 8/10 in discomfort. Has taken Aleve this week but does not want to take pain medications further. Denies any recent changes in stool or saddle anesthesia. Additionally, reports that she feels she has been getting bloated over te last month or so. Denies any vomiting, changes in diet, headaches, fevers, chills, nightsweats, B symptoms. Has family hx of pancreatic cancer in sister. Sees gynecologist routinely, no vaginal bleeding. Mammo up to date.

## 2021-01-19 NOTE — PHYSICAL EXAM
[EOMI] : extraocular movements intact [No CVA Tenderness] : no CVA  tenderness [Normal] : affect was normal and insight and judgment were intact [de-identified] : no fluid wave appreciated [de-identified] : parapsinal tenderness appreciated in mid back, no spasms, mild point tenderness in lower thoracic spine, FROM in all directions

## 2021-01-19 NOTE — REVIEW OF SYSTEMS
[Abdominal Pain] : no abdominal pain [Nausea] : nausea [Constipation] : no constipation [Diarrhea] : diarrhea [Vomiting] : no vomiting [Heartburn] : no heartburn [Melena] : no melena [Joint Pain] : no joint pain [Joint Stiffness] : no joint stiffness [Joint Swelling] : no joint swelling [Muscle Weakness] : no muscle weakness [Muscle Pain] : muscle pain [Back Pain] : back pain [Negative] : Heme/Lymph

## 2021-01-22 ENCOUNTER — RESULT REVIEW (OUTPATIENT)
Age: 66
End: 2021-01-22

## 2021-01-23 ENCOUNTER — RESULT REVIEW (OUTPATIENT)
Age: 66
End: 2021-01-23

## 2021-01-25 ENCOUNTER — RESULT REVIEW (OUTPATIENT)
Age: 66
End: 2021-01-25

## 2021-01-26 ENCOUNTER — APPOINTMENT (OUTPATIENT)
Dept: GASTROENTEROLOGY | Facility: HOSPITAL | Age: 66
End: 2021-01-26

## 2021-07-02 ENCOUNTER — APPOINTMENT (OUTPATIENT)
Dept: ENDOCRINOLOGY | Facility: CLINIC | Age: 66
End: 2021-07-02
Payer: MEDICARE

## 2021-07-02 VITALS
HEART RATE: 88 BPM | HEIGHT: 60 IN | DIASTOLIC BLOOD PRESSURE: 72 MMHG | BODY MASS INDEX: 21.99 KG/M2 | WEIGHT: 112 LBS | OXYGEN SATURATION: 99 % | SYSTOLIC BLOOD PRESSURE: 98 MMHG | RESPIRATION RATE: 16 BRPM

## 2021-07-02 PROCEDURE — 99215 OFFICE O/P EST HI 40 MIN: CPT

## 2021-07-02 PROCEDURE — 99072 ADDL SUPL MATRL&STAF TM PHE: CPT

## 2021-07-15 NOTE — HISTORY OF PRESENT ILLNESS
[FreeTextEntry1] : Ms. BRAYAN TOLBERT is a 65 year old female\par initially self referred for osteoporosis seen Dr Vieira 9/19 but wanted to see me , received once Prolia 7/2020 tolerated well no side effects\par reports joint aches chronically not getting worse, does have a  , walking 4hrs at a time \par labs reviewed very good \par had lip swelling pretty severe same day with her first Actonel dose , but also she is allergic to shellfish, ate outside with a fried few hrs before it happened \par had one episode of kidney stones 2012 \par denies h/o fractures\par had some amenorrhea episodes per pt for years\par menopause 44\par on Omeproazole on and off for GERD\par \par Per Dr Vieira notes : \par "Ms. BRAYAN TOLBERT is 63 year old who presents for osteoporosis evaluation. \par she was diagnosed of osteoporosis on the basis of DXA scan \par her Last [facility] was done in 2018 [facility], -2.5 at level os spine \par she has had fractures -none \par  family h/o fracture -none \par She under went menopause at the age of 44 \par She received HRT -no \par she received any treatment for osteoporosis - none \par  h/o malignancies -no \par  h/o radiation treatment -no \par  h/o clots-no \par h/o long-term steroids ( > 3 mths ) -no \par h/o phenytoin use , seizures -no \par h/o eating disorders -no \par h/o prolonged amenorrhea -no \par Calcium intake \par Dietary - yes cheese \par supplemental -none \par Vit D intake - started yesterday \par h/o renal stones -yes \par h/o reflux disease -yes ,on PPI \par h/o malabsorption - lactose intolerant \par h/o falls or balance issues -yes "\par

## 2021-08-02 ENCOUNTER — APPOINTMENT (OUTPATIENT)
Dept: FAMILY MEDICINE | Facility: CLINIC | Age: 66
End: 2021-08-02

## 2021-10-22 LAB
24R-OH-CALCIDIOL SERPL-MCNC: 118 PG/ML
25(OH)D3 SERPL-MCNC: 45.8 NG/ML
ALBUMIN SERPL ELPH-MCNC: 4.6 G/DL
ALP BLD-CCNC: 40 U/L
ALT SERPL-CCNC: 11 U/L
ANION GAP SERPL CALC-SCNC: 13 MMOL/L
AST SERPL-CCNC: 15 U/L
BILIRUB SERPL-MCNC: 0.5 MG/DL
BUN SERPL-MCNC: 22 MG/DL
CALCIUM SERPL-MCNC: 9.4 MG/DL
CHLORIDE SERPL-SCNC: 105 MMOL/L
CO2 SERPL-SCNC: 23 MMOL/L
COLLAGEN NTX UR-SCNC: 222 NMOL BCE
COLLAGEN NTX/CREAT UR-SRTO: 15
CREAT SERPL-MCNC: 0.71 MG/DL
CREAT UR-MCNC: 169.9 MG/DL
GLUCOSE SERPL-MCNC: 85 MG/DL
INTERPRETIVE GUIDE:: NORMAL
PHOSPHATE SERPL-MCNC: 3 MG/DL
POTASSIUM SERPL-SCNC: 4.3 MMOL/L
PROT SERPL-MCNC: 6.5 G/DL
SODIUM SERPL-SCNC: 141 MMOL/L
TSH SERPL-ACNC: 1.01 UIU/ML

## 2021-12-27 ENCOUNTER — NON-APPOINTMENT (OUTPATIENT)
Age: 66
End: 2021-12-27

## 2021-12-27 ENCOUNTER — APPOINTMENT (OUTPATIENT)
Dept: GASTROENTEROLOGY | Facility: CLINIC | Age: 66
End: 2021-12-27
Payer: MEDICARE

## 2021-12-27 PROCEDURE — 99213 OFFICE O/P EST LOW 20 MIN: CPT

## 2021-12-27 NOTE — ASSESSMENT
[FreeTextEntry1] : -hx polyp - sessile cecal polyp, s/p EMR - due for f/u - Colonoscopy scheduled - Risks, benefits, alternatives were discussed, including but not limited to bleeding, infection, perforation and sedation risks. Additionally, the possibility of missed lesions was conveyed.\par \par -dysphagia - resolved.  If recurs, motility w/u recommended.\par \par -lactose intolerance- cont lactose-free.\par \par PMD/consultation/hospital notes and Labs/imaging/prior endoscopic results reviewed to extent noted in HPI; and, if procedure code billed on this visit for lab draw, this serves to signify that labs were drawn here in this office.\par

## 2021-12-27 NOTE — HISTORY OF PRESENT ILLNESS
[FreeTextEntry1] : 66f osteoporosis, hx colon polyps here for f/u - initially seen last year for diarrhea, dysphagia - w/u as below - both sx resolved, but sessile polyp in cecum noted and is due for 1y f/u at this point as per advanced endoscopist - see below.  Pt denies abdominal pain, rectal bleeding, change in bowel habits, or unexplained weight loss.  \par \par Lactose intolerant for years, but maintains lactose-free diet.  No bleeding/abd pain.  \par \par 11/2016 colon polyp - 5y f/u\par 7/2020 colonoscopy fir diarrhea - TA b/w ICV and appendicx, EMR; stools neg\par 7/2020 EGD for dysphagia normal--> 1/2021 normal esophagram - rec manometry if sx recur\par 10//2020 colonoscopy thickened nodular fol b/w icv and lucas. hyperplastic\par 1/2021w/ Dr. Leiva colonoscopy - thickened mild abnl mucosa b/w icv and lucas -  EMR: lipoma\par REC was for 1y f/u.\par \par Soc:  no tobacco or significant EtOH\par FHx: no FHx GI malignancy or IBD\par \par ROS:\par Constitutional:: no weight loss, fevers\par ENT: no deafness\par Eyes: not blind\par Neck: no LN\par Chest: no dyspnea/cough\par Cardiac: no chest pain\par Vascular: no leg swelling\par GI: no abdominal pain, nausea, vomiting, diarrhea, constipation, rectal bleeding, dysphagia, melena unless otherwise noted in HPI\par : no dysuria, dark urine\par Skin: no rashes, jaundice\par Heme: no bleeding\par Endocrine: no DM unless otherwise stated in HPI\par \par Px: (VS noted below)\par General: NAD\par Eyes: anicteric\par Oropharynx:  clear\par Neck: no LN\par Chest: normal respiratory effort\par CVS: regular\par Abd: soft, NT, ND, +BS, no HSM\par Ext: no atrophy\par Neuro: grossly nonfocal\par \par Labs/imaging/prior endoscopic results reviewed to the extent available and noted in HPI

## 2021-12-28 ENCOUNTER — RESULT REVIEW (OUTPATIENT)
Age: 66
End: 2021-12-28

## 2021-12-28 ENCOUNTER — APPOINTMENT (OUTPATIENT)
Dept: FAMILY MEDICINE | Facility: CLINIC | Age: 66
End: 2021-12-28
Payer: MEDICARE

## 2021-12-28 ENCOUNTER — NON-APPOINTMENT (OUTPATIENT)
Age: 66
End: 2021-12-28

## 2021-12-28 VITALS
HEIGHT: 60 IN | TEMPERATURE: 98.1 F | OXYGEN SATURATION: 97 % | BODY MASS INDEX: 20.42 KG/M2 | DIASTOLIC BLOOD PRESSURE: 68 MMHG | HEART RATE: 59 BPM | WEIGHT: 104 LBS | SYSTOLIC BLOOD PRESSURE: 98 MMHG

## 2021-12-28 DIAGNOSIS — Z00.00 ENCOUNTER FOR GENERAL ADULT MEDICAL EXAMINATION W/OUT ABNORMAL FINDINGS: ICD-10-CM

## 2021-12-28 DIAGNOSIS — K21.9 GASTRO-ESOPHAGEAL REFLUX DISEASE W/OUT ESOPHAGITIS: ICD-10-CM

## 2021-12-28 DIAGNOSIS — R94.31 ABNORMAL ELECTROCARDIOGRAM [ECG] [EKG]: ICD-10-CM

## 2021-12-28 DIAGNOSIS — M81.0 AGE-RELATED OSTEOPOROSIS W/OUT CURRENT PATHOLOGICAL FRACTURE: ICD-10-CM

## 2021-12-28 DIAGNOSIS — E78.5 HYPERLIPIDEMIA, UNSPECIFIED: ICD-10-CM

## 2021-12-28 DIAGNOSIS — N90.4 LEUKOPLAKIA OF VULVA: ICD-10-CM

## 2021-12-28 PROCEDURE — G0439: CPT

## 2021-12-28 PROCEDURE — 36415 COLL VENOUS BLD VENIPUNCTURE: CPT

## 2021-12-28 PROCEDURE — 93000 ELECTROCARDIOGRAM COMPLETE: CPT | Mod: 59

## 2021-12-28 PROCEDURE — G0444 DEPRESSION SCREEN ANNUAL: CPT

## 2021-12-29 ENCOUNTER — APPOINTMENT (OUTPATIENT)
Dept: ENDOCRINOLOGY | Facility: CLINIC | Age: 66
End: 2021-12-29
Payer: MEDICARE

## 2021-12-29 VITALS
HEIGHT: 61 IN | DIASTOLIC BLOOD PRESSURE: 58 MMHG | SYSTOLIC BLOOD PRESSURE: 100 MMHG | BODY MASS INDEX: 20.47 KG/M2 | OXYGEN SATURATION: 99 % | WEIGHT: 108.4 LBS | HEART RATE: 65 BPM

## 2021-12-29 DIAGNOSIS — Z86.39 PERSONAL HISTORY OF OTHER ENDOCRINE, NUTRITIONAL AND METABOLIC DISEASE: ICD-10-CM

## 2021-12-29 DIAGNOSIS — N20.0 CALCULUS OF KIDNEY: ICD-10-CM

## 2021-12-29 PROCEDURE — 99215 OFFICE O/P EST HI 40 MIN: CPT

## 2021-12-30 ENCOUNTER — APPOINTMENT (OUTPATIENT)
Dept: CARDIOLOGY | Facility: CLINIC | Age: 66
End: 2021-12-30
Payer: MEDICARE

## 2021-12-30 VITALS
DIASTOLIC BLOOD PRESSURE: 70 MMHG | OXYGEN SATURATION: 99 % | BODY MASS INDEX: 19.84 KG/M2 | HEART RATE: 77 BPM | WEIGHT: 105 LBS | SYSTOLIC BLOOD PRESSURE: 102 MMHG

## 2021-12-30 DIAGNOSIS — Z60.2 PROBLEMS RELATED TO LIVING ALONE: ICD-10-CM

## 2021-12-30 DIAGNOSIS — Z87.19 PERSONAL HISTORY OF OTHER DISEASES OF THE DIGESTIVE SYSTEM: ICD-10-CM

## 2021-12-30 DIAGNOSIS — Z87.39 PERSONAL HISTORY OF OTHER DISEASES OF THE MUSCULOSKELETAL SYSTEM AND CONNECTIVE TISSUE: ICD-10-CM

## 2021-12-30 DIAGNOSIS — Z87.442 PERSONAL HISTORY OF URINARY CALCULI: ICD-10-CM

## 2021-12-30 DIAGNOSIS — Z87.891 PERSONAL HISTORY OF NICOTINE DEPENDENCE: ICD-10-CM

## 2021-12-30 DIAGNOSIS — Z86.010 PERSONAL HISTORY OF COLONIC POLYPS: ICD-10-CM

## 2021-12-30 PROCEDURE — 93000 ELECTROCARDIOGRAM COMPLETE: CPT

## 2021-12-30 PROCEDURE — 99214 OFFICE O/P EST MOD 30 MIN: CPT

## 2021-12-30 SDOH — SOCIAL STABILITY - SOCIAL INSECURITY: PROBLEMS RELATED TO LIVING ALONE: Z60.2

## 2021-12-31 ENCOUNTER — NON-APPOINTMENT (OUTPATIENT)
Age: 66
End: 2021-12-31

## 2021-12-31 PROBLEM — Z87.19 HISTORY OF GASTROESOPHAGEAL REFLUX (GERD): Status: RESOLVED | Noted: 2021-12-31 | Resolved: 2021-12-31

## 2021-12-31 PROBLEM — Z87.891 FORMER SMOKER: Status: ACTIVE | Noted: 2018-11-07

## 2021-12-31 PROBLEM — N20.0 KIDNEY STONE: Status: ACTIVE | Noted: 2019-12-03

## 2021-12-31 PROBLEM — Z87.39 HISTORY OF OSTEOPENIA: Status: RESOLVED | Noted: 2021-12-31 | Resolved: 2021-12-31

## 2021-12-31 PROBLEM — Z86.39 HISTORY OF VITAMIN D DEFICIENCY: Status: RESOLVED | Noted: 2019-11-18 | Resolved: 2021-12-31

## 2021-12-31 PROBLEM — Z87.442 HISTORY OF KIDNEY STONES: Status: RESOLVED | Noted: 2021-12-31 | Resolved: 2021-12-31

## 2021-12-31 PROBLEM — Z86.010 HISTORY OF COLONIC POLYPS: Status: RESOLVED | Noted: 2021-12-31 | Resolved: 2021-12-31

## 2021-12-31 PROBLEM — Z60.2 LIVES ALONE: Status: ACTIVE | Noted: 2021-12-31

## 2021-12-31 NOTE — HISTORY OF PRESENT ILLNESS
[FreeTextEntry1] :   66-year-old female\par Cardiology consultation requested because of chest pain and  an abnormal electrocardiogram.\par \par Kathleen has no known heart disease. There is no history of myocardial infarction angina or congestive heart failure. There is a long history of random eating epigastric and chest discomfort at rest. Belching will alleviate the sensation. No exertional chest pain. No shortness of breath. No palpitations. No syncope. She exercises regularly without restriction or limitation.\par \par \par A 12/28/21 electrocardiogram was described as abnormal and a cardiology consultation was advised.\par \par There is no history of diabetes or hypertension. She smoked one to 2 packs of cigarettes/day stopping 40 years ago there is no family history of a myocardial infarction or sudden death. Cholesterol levels have been elevated with high HDL levels. Medical therapy has not been advised.\par \par Ms. Mccurdy presents today for cardiovascular evaluation.

## 2021-12-31 NOTE — DISCUSSION/SUMMARY
[FreeTextEntry1] : Chest pain/abnormal echocardiogram\par The working diagnosis is reflux esophagitis/intestinal gas. The history is compelling for this diagnosis.  The "abnormalities" seen on the 12/28/21 electrocardiogram are nonspecific and most likely represent minor conduction system sclerosis  and  alteration of lead placement. The differential diagnosis would include myocardial ischemia/atherosclerotic heart disease. The patient does have a prior history of hyperlipidemia and is postmenopausal. Noninvasive cardiac studies would be helpful for further evaluation.\par \par \par I have recommended the following\par a. Reassurance\par b. Echocardiogram\par c. Stress treadmill ECG study\par \par \par \par \par Hyperlipidemia\par Hyperlipidemia represents a risk factor for atherosclerotic heart disease. The target LDL level for primary prevention is about 100. In 12/21  the  serum cholesterol level was 247 triglycerides 71  and . The ability of high HDL levels to provide protection against cardiovascular events is controversial. The main clinical decision involves whether or not to institute antihyperlipidemic medical therapy. Nonpharmacological therapy, specifically diet and exercise are emphasized  as  major aspects of treatment.\par \par I have recommended the following\par a. Low fat low cholesterol diet. Regular aerobic exercise\par b. Target LDL level to about 100 as discussed above\par c. Screening exercise treadmill ECG study\par d. HMG co A reductase inhibitor therapy dependent on response to above measures the patient's clinical course\par \par \par \par The diagnosis, prognosis, risks, options and alternatives were explained at length to the patient. All questions were answered. Issues discussed included chest pain atherosclerotic heart disease noninvasive cardiac testing hyperlipidemia antihyperlipidemic medical therapy diet and exercise.\par \par Counseling and orl coordination of care\par Time was a significant factor for this patient encounter. Total time spent with the patient was 60 minutes. Greater than 50% of the time was devoted to counseling and/or l coordination of care

## 2021-12-31 NOTE — PHYSICAL EXAM
[Normal Conjunctiva] : normal conjunctiva [Normal S1, S2] : normal S1, S2 [Clear Lung Fields] : clear lung fields [Soft] : abdomen soft [Non Tender] : non-tender [Normal Bowel Sounds] : normal bowel sounds [Normal Gait] : normal gait [No Rash] : no rash [No Focal Deficits] : no focal deficits [de-identified] : Appears fit trim in no distress lying flat [de-identified] : normocephalic [de-identified] : no carotid bruit. No jugular venous distention. [de-identified] : No murmur. No gallop. No diastolic sounds. [de-identified] : full range of motion [de-identified] : Dorsalis pedis pulses +2 bilaterally. Feet warm and well-perfused. No ulcerations.  No peripheral edema [de-identified] : pleasant

## 2021-12-31 NOTE — HISTORY OF PRESENT ILLNESS
[FreeTextEntry1] : Ms. BRAYAN TOLBERT is a 66 year old female\par initially self referred for osteoporosis seen Dr Vieira 9/19 but wanted to see me , received once Prolia 7/2020 tolerated well no side effects\par reports joint aches chronically not getting worse, does have a  , walking 4hrs at a time \par labs reviewed very good \par had lip swelling pretty severe same day with her first Actonel dose , but also she is allergic to shellfish, ate outside with a fried few hrs before it happened \par had one episode of kidney stones 2012 \par denies h/o fractures\par had some amenorrhea episodes per pt for years\par menopause 44\par on Omeproazole on and off for GERD\par \par Per Dr Vieira notes : \par "Ms. BRAYAN TOLBERT is 63 year old who presents for osteoporosis evaluation. \par she was diagnosed of osteoporosis on the basis of DXA scan \par her Last DXA scan was done in 2018 at [facility], -2.5 at level os spine \par she has had fractures -none \par  family h/o fracture -none \par She under went menopause at the age of 44 \par She received HRT -no \par she received any treatment for osteoporosis - none \par  h/o malignancies -no \par  h/o radiation treatment -no \par  h/o clots-no \par h/o long-term steroids ( > 3 mths ) -no \par h/o phenytoin use , seizures -no \par h/o eating disorders -no \par h/o prolonged amenorrhea -no \par Calcium intake \par Dietary - yes cheese \par supplemental -none \par Vit D intake - started yesterday \par h/o renal stones -yes \par h/o reflux disease -yes ,on PPI \par h/o malabsorption - lactose intolerant \par h/o falls or balance issues -yes "\par

## 2022-01-03 PROBLEM — E78.5 HYPERLIPIDEMIA: Status: ACTIVE | Noted: 2021-12-31

## 2022-01-03 PROBLEM — N90.4 LICHEN SCLEROSUS OF FEMALE GENITALIA: Status: ACTIVE | Noted: 2018-12-11

## 2022-01-03 PROBLEM — M81.0 OSTEOPOROSIS: Status: ACTIVE | Noted: 2019-09-25

## 2022-01-03 PROBLEM — R94.31 ABNORMAL EKG: Status: ACTIVE | Noted: 2021-12-28

## 2022-01-03 PROBLEM — K21.9 ESOPHAGEAL REFLUX: Status: ACTIVE | Noted: 2020-06-17

## 2022-01-03 NOTE — PHYSICAL EXAM

## 2022-01-03 NOTE — HISTORY OF PRESENT ILLNESS
[FreeTextEntry1] : Annual physical [de-identified] : Patient is a 65 yo F with hx of osteoporosis here for annual physical. Patient follows with Dr. Mena, endocrinology for osteoporosis. Currently denies any acute complaints. UTD with all screening mammo, pap, and colonoscopies. UTD with covid and age related vaccinations. EKG today showing inverted T-waves on examination of precordial leads in comparison to previous EKGs. Patient denies any chest pain, or previous symptoms. Patient anticipating moving to [place] at onset of the new year.

## 2022-01-03 NOTE — PLAN
[FreeTextEntry1] : 67 yo F here for annual physical\par - screening labs drawn in office today\par - cardiology referral for abnormal changes in EKG\par - follows with Dr. Mena for osteoporosis regularly; follow up appt this week

## 2022-01-03 NOTE — HEALTH RISK ASSESSMENT
[0] : 2) Feeling down, depressed, or hopeless: Not at all (0) [PHQ-2 Negative - No further assessment needed] : PHQ-2 Negative - No further assessment needed [[id number]] : 0 [HIV test declined] : HIV test declined [None] : None

## 2022-01-11 ENCOUNTER — APPOINTMENT (OUTPATIENT)
Dept: ENDOCRINOLOGY | Facility: CLINIC | Age: 67
End: 2022-01-11

## 2022-01-11 ENCOUNTER — APPOINTMENT (OUTPATIENT)
Dept: OBGYN | Facility: CLINIC | Age: 67
End: 2022-01-11
Payer: MEDICARE

## 2022-01-11 VITALS
DIASTOLIC BLOOD PRESSURE: 62 MMHG | BODY MASS INDEX: 20.2 KG/M2 | HEIGHT: 61 IN | SYSTOLIC BLOOD PRESSURE: 104 MMHG | WEIGHT: 107 LBS

## 2022-01-11 DIAGNOSIS — Z01.419 ENCOUNTER FOR GYNECOLOGICAL EXAMINATION (GENERAL) (ROUTINE) W/OUT ABNORMAL FINDINGS: ICD-10-CM

## 2022-01-11 LAB
ALBUMIN SERPL ELPH-MCNC: 4.4 G/DL
ALP BLD-CCNC: 43 U/L
ALT SERPL-CCNC: 14 U/L
ANION GAP SERPL CALC-SCNC: 12 MMOL/L
APPEARANCE: CLEAR
AST SERPL-CCNC: 16 U/L
BACTERIA: NEGATIVE
BASOPHILS # BLD AUTO: 0.05 K/UL
BASOPHILS NFR BLD AUTO: 0.8 %
BILIRUB SERPL-MCNC: 0.6 MG/DL
BILIRUBIN URINE: NEGATIVE
BLOOD URINE: NEGATIVE
BUN SERPL-MCNC: 20 MG/DL
CALCIUM SERPL-MCNC: 9.2 MG/DL
CHLORIDE SERPL-SCNC: 107 MMOL/L
CHOLEST SERPL-MCNC: 247 MG/DL
CO2 SERPL-SCNC: 24 MMOL/L
COLOR: NORMAL
CREAT SERPL-MCNC: 0.66 MG/DL
EOSINOPHIL # BLD AUTO: 0.14 K/UL
EOSINOPHIL NFR BLD AUTO: 2.3 %
ESTIMATED AVERAGE GLUCOSE: 108 MG/DL
GLUCOSE QUALITATIVE U: NEGATIVE
GLUCOSE SERPL-MCNC: 91 MG/DL
HBA1C MFR BLD HPLC: 5.4 %
HCT VFR BLD CALC: 40.7 %
HDLC SERPL-MCNC: 106 MG/DL
HGB BLD-MCNC: 13 G/DL
HYALINE CASTS: 0 /LPF
IMM GRANULOCYTES NFR BLD AUTO: 0.5 %
KETONES URINE: NEGATIVE
LDLC SERPL CALC-MCNC: 126 MG/DL
LEUKOCYTE ESTERASE URINE: NEGATIVE
LYMPHOCYTES # BLD AUTO: 2.72 K/UL
LYMPHOCYTES NFR BLD AUTO: 45.6 %
MAN DIFF?: NORMAL
MCHC RBC-ENTMCNC: 30.7 PG
MCHC RBC-ENTMCNC: 31.9 GM/DL
MCV RBC AUTO: 96 FL
MICROSCOPIC-UA: NORMAL
MONOCYTES # BLD AUTO: 0.32 K/UL
MONOCYTES NFR BLD AUTO: 5.4 %
NEUTROPHILS # BLD AUTO: 2.71 K/UL
NEUTROPHILS NFR BLD AUTO: 45.4 %
NITRITE URINE: NEGATIVE
NONHDLC SERPL-MCNC: 140 MG/DL
PH URINE: 6.5
PLATELET # BLD AUTO: 250 K/UL
POTASSIUM SERPL-SCNC: 4.3 MMOL/L
PROT SERPL-MCNC: 6.6 G/DL
PROTEIN URINE: NEGATIVE
RBC # BLD: 4.24 M/UL
RBC # FLD: 14.8 %
RED BLOOD CELLS URINE: 3 /HPF
SODIUM SERPL-SCNC: 143 MMOL/L
SPECIFIC GRAVITY URINE: 1.02
SQUAMOUS EPITHELIAL CELLS: 1 /HPF
TRIGL SERPL-MCNC: 71 MG/DL
TSH SERPL-ACNC: 0.67 UIU/ML
UROBILINOGEN URINE: NORMAL
WBC # FLD AUTO: 5.97 K/UL
WHITE BLOOD CELLS URINE: 0 /HPF

## 2022-01-11 PROCEDURE — G0101: CPT

## 2022-01-11 RX ORDER — CLOBETASOL PROPIONATE 0.5 MG/G
0.05 OINTMENT TOPICAL
Qty: 2 | Refills: 3 | Status: ACTIVE | COMMUNITY
Start: 2022-01-11 | End: 1900-01-01

## 2022-01-11 NOTE — HISTORY OF PRESENT ILLNESS
[TextBox_4] : 66 year old pt for annual exam. Has lichen sclerosus and wants to know if there are any new treatment strategies available. She previously tried non ablative laser treatment without relief. She is currently using clobetasol but is concerned about side effects of chronic steroid use; reports she occasionally experiences tearing of the skin. \par She has no other complaints today. She is up to date with breast and colon ca screenings and has no history of cervical dysplasia. She has osteoporosis and is followed by her endocrinologist.

## 2022-01-11 NOTE — PLAN
[FreeTextEntry1] : Clobetasol prescription renewed. Pap done. Discussed with pt limited treatment options for lichen sclerosus.

## 2022-01-11 NOTE — PHYSICAL EXAM
[Appropriately responsive] : appropriately responsive [Alert] : alert [No Acute Distress] : no acute distress [No Lymphadenopathy] : no lymphadenopathy [Soft] : soft [Non-tender] : non-tender [Non-distended] : non-distended [No HSM] : No HSM [No Lesions] : no lesions [No Mass] : no mass [Oriented x3] : oriented x3 [Examination Of The Breasts] : a normal appearance [No Masses] : no breast masses were palpable [Vulvar Stricture] : vulvar stricture [Labia Majora] : normal [Labia Minora] : normal [Normal] : normal [Uterine Adnexae] : normal

## 2022-01-13 ENCOUNTER — APPOINTMENT (OUTPATIENT)
Dept: GASTROENTEROLOGY | Facility: CLINIC | Age: 67
End: 2022-01-13
Payer: MEDICARE

## 2022-01-13 ENCOUNTER — APPOINTMENT (OUTPATIENT)
Dept: CARDIOLOGY | Facility: CLINIC | Age: 67
End: 2022-01-13
Payer: MEDICARE

## 2022-01-13 VITALS
TEMPERATURE: 97.2 F | BODY MASS INDEX: 20.2 KG/M2 | HEART RATE: 81 BPM | DIASTOLIC BLOOD PRESSURE: 70 MMHG | OXYGEN SATURATION: 98 % | HEIGHT: 61 IN | WEIGHT: 107 LBS | SYSTOLIC BLOOD PRESSURE: 100 MMHG

## 2022-01-13 DIAGNOSIS — R07.9 CHEST PAIN, UNSPECIFIED: ICD-10-CM

## 2022-01-13 DIAGNOSIS — R11.0 NAUSEA: ICD-10-CM

## 2022-01-13 DIAGNOSIS — E73.9 LACTOSE INTOLERANCE, UNSPECIFIED: ICD-10-CM

## 2022-01-13 DIAGNOSIS — R13.10 DYSPHAGIA, UNSPECIFIED: ICD-10-CM

## 2022-01-13 DIAGNOSIS — Z86.010 PERSONAL HISTORY OF COLONIC POLYPS: ICD-10-CM

## 2022-01-13 PROCEDURE — 99213 OFFICE O/P EST LOW 20 MIN: CPT

## 2022-01-13 PROCEDURE — 93306 TTE W/DOPPLER COMPLETE: CPT

## 2022-01-13 NOTE — HISTORY OF PRESENT ILLNESS
[FreeTextEntry1] : 66f osteoporosis, hx colon polyps here for f/u - initially seen last year for diarrhea, dysphagia - w/u as below - both sx resolved, but sessile polyp in cecum noted and is due for 1y f/u at this point as per advanced endoscopist - see below.  Pt seen last month and colonoscopy scheduled for next wk.  She called w/ additional sx of epigastric fullness, nausea and early satiety. She denies melena.  Mild dyspepsia.  No NSAIDs.  She is seeing Dr. Marinelli for stress test next wk as well.  \par \par Lactose intolerant for years, but maintains lactose-free diet.   \par \par Chest pain w/u with Dr. Marinelli - echo/stress pending next wk\par \par Prior testing:\par 11/2016 colon polyp - 5y f/u\par 7/2020 colonoscopy fir diarrhea - TA b/w ICV and appendicx, EMR; stools neg\par 7/2020 EGD for dysphagia normal--> 1/2021 normal esophagram - rec manometry if sx recur\par 10//2020 colonoscopy thickened nodular fol b/w icv and lucas. hyperplastic\par 1/2021w/ Dr. Leiva colonoscopy - thickened mild abnl mucosa b/w icv and lucas -  EMR: lipoma\par REC was for 1y f/u.\par \par Soc:  no tobacco or significant EtOH\par FHx: no FHx GI malignancy or IBD\par \par ROS:\par Constitutional:: no weight loss, fevers\par ENT: no deafness\par Eyes: not blind\par Neck: no LN\par Chest: no dyspnea/cough\par Cardiac: no chest pain\par Vascular: no leg swelling\par GI: no abdominal pain, nausea, vomiting, diarrhea, constipation, rectal bleeding, dysphagia, melena unless otherwise noted in HPI\par : no dysuria, dark urine\par Skin: no rashes, jaundice\par Heme: no bleeding\par Endocrine: no DM unless otherwise stated in HPI\par \par Px: (VS noted below)\par General: NAD\par Eyes: anicteric\par Oropharynx:  clear\par Neck: no LN\par Chest: normal respiratory effort\par CVS: regular\par Abd: soft, NT, ND, +BS, no HSM\par Ext: no atrophy\par Neuro: grossly nonfocal\par \par Labs/imaging/prior endoscopic results reviewed to the extent available and noted in HPI

## 2022-01-13 NOTE — ASSESSMENT
[FreeTextEntry1] : Nausea- eval w/ US and cont ppi.  Will do EGD at time of colonoscopy - pending completion of cardiac testing\par \par hx polyp - sessile cecal polyp, s/p EMR - due for f/u - defer until after cardiac testing next wk\par \par -dysphagia - resolved.  If recurs, motility w/u recommended.\par \par -lactose intolerance- cont lactose-free.\par \par PMD/consultation/hospital notes and Labs/imaging/prior endoscopic results reviewed to extent noted in HPI; and, if procedure code billed on this visit for lab draw, this serves to signify that labs were drawn here in this office.\par

## 2022-01-17 DIAGNOSIS — Z86.79 PERSONAL HISTORY OF OTHER DISEASES OF THE CIRCULATORY SYSTEM: ICD-10-CM

## 2022-01-18 ENCOUNTER — RESULT REVIEW (OUTPATIENT)
Age: 67
End: 2022-01-18

## 2022-01-18 ENCOUNTER — APPOINTMENT (OUTPATIENT)
Dept: GASTROENTEROLOGY | Facility: HOSPITAL | Age: 67
End: 2022-01-18

## 2022-01-19 LAB
CYTOLOGY CVX/VAG DOC THIN PREP: ABNORMAL
HPV HIGH+LOW RISK DNA PNL CVX: NOT DETECTED

## 2022-01-21 ENCOUNTER — APPOINTMENT (OUTPATIENT)
Dept: CARDIOLOGY | Facility: CLINIC | Age: 67
End: 2022-01-21
Payer: MEDICARE

## 2022-01-21 PROCEDURE — 93015 CV STRESS TEST SUPVJ I&R: CPT

## 2022-01-24 ENCOUNTER — NON-APPOINTMENT (OUTPATIENT)
Age: 67
End: 2022-01-24

## 2022-02-05 ENCOUNTER — RESULT REVIEW (OUTPATIENT)
Age: 67
End: 2022-02-05

## 2022-02-07 ENCOUNTER — RESULT REVIEW (OUTPATIENT)
Age: 67
End: 2022-02-07

## 2022-02-08 ENCOUNTER — APPOINTMENT (OUTPATIENT)
Dept: GASTROENTEROLOGY | Facility: HOSPITAL | Age: 67
End: 2022-02-08

## 2022-03-01 ENCOUNTER — APPOINTMENT (OUTPATIENT)
Dept: FAMILY MEDICINE | Facility: CLINIC | Age: 67
End: 2022-03-01
Payer: MEDICARE

## 2022-03-01 VITALS
OXYGEN SATURATION: 98 % | HEIGHT: 61 IN | SYSTOLIC BLOOD PRESSURE: 92 MMHG | RESPIRATION RATE: 16 BRPM | TEMPERATURE: 98.1 F | BODY MASS INDEX: 20.58 KG/M2 | WEIGHT: 109 LBS | HEART RATE: 64 BPM | DIASTOLIC BLOOD PRESSURE: 64 MMHG

## 2022-03-01 DIAGNOSIS — R51.9 HEADACHE, UNSPECIFIED: ICD-10-CM

## 2022-03-01 PROCEDURE — 99213 OFFICE O/P EST LOW 20 MIN: CPT

## 2022-03-01 RX ORDER — ASCORBIC ACID 500 MG
TABLET ORAL
Refills: 0 | Status: COMPLETED | COMMUNITY
Start: 2021-12-29 | End: 2022-03-01

## 2022-03-01 NOTE — HISTORY OF PRESENT ILLNESS
[FreeTextEntry8] : 66 year old female presenting with right sided headache for a week. Patient describes the pain as constant numbing pain rates it around 2/10 but has intermittent worsening of the pain when it's 10/10 but lasts less than a minute. Patient states when she has the flares moving the head makes it worse. She also has noticed intermittent blurred vision at night since the headache started. She has not tried any medication for the headache. The pain is not associated with light, noise, hydration, or anything and can't think of anything that triggers it. Patient states 3 months ago while getting up from the floor she hit her head on the corner of a TV and fainted. When she awoke she was bleeding and went to the ED where they stitched her up. This occurred in her home country, [place]. No further work up was done.

## 2022-03-03 ENCOUNTER — NON-APPOINTMENT (OUTPATIENT)
Age: 67
End: 2022-03-03

## 2022-03-22 ENCOUNTER — RX RENEWAL (OUTPATIENT)
Age: 67
End: 2022-03-22

## 2022-03-22 RX ORDER — OMEPRAZOLE 40 MG/1
40 CAPSULE, DELAYED RELEASE ORAL
Qty: 90 | Refills: 0 | Status: ACTIVE | COMMUNITY
Start: 2019-06-10 | End: 1900-01-01

## 2022-04-06 ENCOUNTER — APPOINTMENT (OUTPATIENT)
Dept: NEUROLOGY | Facility: CLINIC | Age: 67
End: 2022-04-06

## 2022-06-29 ENCOUNTER — APPOINTMENT (OUTPATIENT)
Dept: ENDOCRINOLOGY | Facility: CLINIC | Age: 67
End: 2022-06-29

## 2023-01-24 ENCOUNTER — APPOINTMENT (OUTPATIENT)
Dept: CARDIOLOGY | Facility: CLINIC | Age: 68
End: 2023-01-24

## 2023-01-31 ENCOUNTER — APPOINTMENT (OUTPATIENT)
Dept: CARDIOLOGY | Facility: CLINIC | Age: 68
End: 2023-01-31

## 2023-10-01 PROBLEM — R87.810 CERVICAL HIGH RISK HUMAN PAPILLOMAVIRUS (HPV) DNA TEST POSITIVE: Status: ACTIVE | Noted: 2018-11-07
